# Patient Record
Sex: FEMALE | Race: WHITE
[De-identification: names, ages, dates, MRNs, and addresses within clinical notes are randomized per-mention and may not be internally consistent; named-entity substitution may affect disease eponyms.]

---

## 2017-01-13 ENCOUNTER — HOSPITAL ENCOUNTER (OUTPATIENT)
Dept: HOSPITAL 80 - FIMAGING | Age: 62
End: 2017-01-13
Attending: PODIATRIST
Payer: COMMERCIAL

## 2017-01-13 DIAGNOSIS — S93.402A: Primary | ICD-10-CM

## 2017-01-13 NOTE — MR
MRI of the Left Forefoot, Without Contrast, January 13, 2017

 

History: Second plantar plate dysfunction and pain.

 

Technique: Axial, sagittal, and coronal MR sequences of the left forefoot are obtained.

 

Findings: On the coronal series, there is evidence of attenuation and partial tear of the plantar fadi
te over the second metatarsophalangeal joint. This finding is best visualized on coronal image 18 of 
series 5 where there are linear hyperintense striations extending through the plantar plate, especial
ly along the lateral margin. Findings appear to represent a partial tear, although a small full-thick
ness perforation is not excluded. There is mild adjacent soft tissue edema including edema along the 
flexor digitorum tendon group. The articular cartilage of the second metatarsophalangeal joint is nor
mal, and the metatarsals maintain normal signal characteristics. Mild features of hallux valgus noted
, without significant chondral loss at the first metatarsophalangeal joint. The sesamoids demonstrate
 normal marrow signal characteristics.

 

Impression:

1. Attenuation and partial tear of the central aspect of the plantar plate at the second metatarsopha
langeal joint. A small full-thickness perforation is not excluded. Adjacent inflammatory edema.

2. Mild hallux valgus without degenerative arthropathy at the first metatarsophalangeal joint.

## 2017-01-13 NOTE — MR
MRI of the Left Ankle, Without Contrast     January 13, 2017

 

History:  Lateral ankle instability.

 

Technique:  Axial, sagittal, and coronal MR sequences of the left ankle are obtained.

 

Findings:  The retromalleolar sulcus is hypoplastic, with secondary lateral subluxation of the perone
al tendons.  The peroneus brevis is interposed between the peroneus longus and the lateral malleolus,
 and there is a partial longitudinal split tear and flattening of the peroneus brevis at the retromal
leolar sulcus.  Additionally, there is marked focal thinning of the peroneus longus as it enters the 
cuboid tunnel compatible with partial tear.  The distal peroneus longus is intact as is the distal pe
roneus brevis.

 

Ankle and subtalar articular cartilage surfaces are intact.  The anterior and posterior talofibular a
nd syndesmotic ligaments are intact.  Flexor tendons are intact.  Achilles tendon and plantar fascia 
appear intact.  Deltoid and spring ligaments are intact.

 

Impression:  Hypoplastic retromalleolar sulcus, with secondary subluxation laterally of the peroneal 
tendons.  Partial longitudinal split tear of the peroneus brevis, along with focal partial tear of th
e peroneus longus at the cuboid hiatus.

## 2017-04-03 ENCOUNTER — HOSPITAL ENCOUNTER (OUTPATIENT)
Dept: HOSPITAL 80 - FSGY | Age: 62
Discharge: HOME | End: 2017-04-03
Attending: PODIATRIST
Payer: COMMERCIAL

## 2017-04-03 DIAGNOSIS — M25.775: ICD-10-CM

## 2017-04-03 DIAGNOSIS — S93.492A: ICD-10-CM
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
## 2017-04-03 DIAGNOSIS — Q66.21: ICD-10-CM

## 2017-04-03 DIAGNOSIS — S93.145A: ICD-10-CM

## 2017-04-03 DIAGNOSIS — M21.622: ICD-10-CM

## 2017-04-03 DIAGNOSIS — M20.12: Primary | ICD-10-CM

## 2017-04-03 PROCEDURE — 0SQL0ZZ REPAIR LEFT TARSOMETATARSAL JOINT, OPEN APPROACH: ICD-10-PCS | Performed by: PODIATRIST

## 2017-04-03 PROCEDURE — 0QBR0ZZ EXCISION OF LEFT TOE PHALANX, OPEN APPROACH: ICD-10-PCS | Performed by: PODIATRIST

## 2017-04-03 PROCEDURE — 0Q8P0ZZ DIVISION OF LEFT METATARSAL, OPEN APPROACH: ICD-10-PCS | Performed by: PODIATRIST

## 2017-04-03 PROCEDURE — 28298 COR HLX VLGS PRX PHLX OSTEOT: CPT

## 2017-04-03 PROCEDURE — 28304 INCISION OF MIDFOOT BONES: CPT

## 2017-04-03 PROCEDURE — 0LQW0ZZ REPAIR LEFT FOOT TENDON, OPEN APPROACH: ICD-10-PCS | Performed by: PODIATRIST

## 2017-04-03 PROCEDURE — 28288 PARTIAL REMOVAL OF FOOT BONE: CPT

## 2017-04-03 PROCEDURE — C1713 ANCHOR/SCREW BN/BN,TIS/BN: HCPCS

## 2017-04-03 PROCEDURE — 28645 REPAIR TOE DISLOCATION: CPT

## 2017-04-03 PROCEDURE — 0SSN04Z REPOSITION LEFT METATARSAL-PHALANGEAL JOINT WITH INTERNAL FIXATION DEVICE, OPEN APPROACH: ICD-10-PCS | Performed by: PODIATRIST

## 2017-04-03 PROCEDURE — 0QBP0ZZ EXCISION OF LEFT METATARSAL, OPEN APPROACH: ICD-10-PCS | Performed by: PODIATRIST

## 2017-04-03 PROCEDURE — C1769 GUIDE WIRE: HCPCS

## 2017-04-04 NOTE — GHP
[f rep st]



                                                       PREOP HISTORY AND PHYSICAL





DATE OF ADMISSION:  04/03/2017



PREOPERATIVE DIAGNOSES:  

1.  Rupture peroneus brevis tendon, left ankle.

2.  Dislocating peroneal tendons, left ankle.

3.  Hallux valgus deformity, left foot.

4.  Metatarsus primus varus, left foot.

5.  Bunionette deformity, left foot.

6.  Rupture plantar plate second metatarsophalangeal joint, left foot.

7.  Subluxation second metatarsophalangeal joint, left foot.

8.  Metatarsalgia, left foot.

9.  First metatarsal cuneiform exostosis, left foot.



OPERATION PERFORMED:  

1.  Left ankle:.

a.  Repair peroneus brevis tendon.

b.  Repair dislocating peroneal tendons without fibular osteotomy.

2.  Left foot:.

a.  Repair hallux valgus.

b.  First metatarsal osteotomy.

c.  Fifth metatarsal osteotomy.

d.  Primary repair plantar plate, second metatarsophalangeal joint.

e.  Open treatment of subluxation with internal fixation, second metatarsophalangeal joint.

f.  Second metatarsal osteotomy.

g.  Partial excision bone, first metatarsal cuneiform joint.



POSTOPERATIVE DIAGNOSES:  

1.  Rupture peroneus brevis tendon, left ankle.

2.  Dislocating peroneal tendons, left ankle.

3.  Hallux valgus deformity, left foot.

4.  Metatarsus primus varus, left foot.

5.  Bunionette deformity, left foot.

6.  Rupture plantar plate second metatarsophalangeal joint, left foot.

7.  Subluxation second metatarsophalangeal joint, left foot.

8.  Metatarsalgia, left foot.

9.  First metatarsal cuneiform exostosis, left foot.



ANESTHESIA:  Marija Kaiser MD



DESCRIPTION OF PROCEDURE:  The patient was taken to the operating room and placed on the table in th
e supine position. Local anesthetic combined with popliteal block and LMA were utilized for patient 
anesthesia. The patient's left foot, ankle and lower leg were prepped and draped in the usual asepti
c manner, establishing a sterile field for surgery. Disposable pneumatic tourniquet over heavy Kerli
x padding at high ankle level was inflated to 240 mmHg to provide intraoperative hemostasis. 



The foot procedures were initially approached. Hallux valgus deformity was approached with a 9 cm li
near incision medial to the extensor tendon. The incision was deepened. Bleeding vessels clamped and
 Bovied. Vital structures identified and retracted. This was the norm for all 6 skin incisions. Soft
 tissue planes were developed about the first MTPJ and the intermetatarsal space was entered. A late
ral release was performed with sesamoid and adductor release. The brevis was left intact. An inverte
d L capsulotomy gained access to the joint capsule and periosteum were reflected from about the head
 and neck of the metatarsal exposing large medial hyperostosis. The medial hyperostosis was resected
 flush with the shaft of the metatarsal utilizing the oscillating saw. During all bone cuts, antibio
tic solution drip prevented bone burning, following cuts removed debris, and intermittently througho
ut surgery prevented tissue drying. A 0.45 Ang wire was driven to function as apex for osteoto
my guide which was employed to perform the long dorsal wing V osteotomy, first metatarsal neck level
. The capital fragment was displaced the appropriate amount on the shaft of the metatarsal, firmly i
mpacted with hand pressure, temporarily fixated with a K-wire, and permanent fixation was with 2.0 m
m cortical screw from a [employer] modular hand set. Two screws were used and with appropriate AO techn
ique, fixation was firm and apposition excellent. The preceding was the case for each of the 3 metat
arsal osteotomies. The K-wire was removed, the screws tightened and the redundant distal medial shaf
t of the metatarsal was resected flush with the transposed capital fragment. A bur smoothed all cut 
bone edges. Copious irrigation removed all debris. Minimal medial capsular tissues were reefed. Medi
al capsular closure with 2-0 Vicryl, dorsal with 3-0 Vicryl. Excellent alignment of the hallux was a
ppreciated and subcutaneous closure was with 5-0 Vicryl, skin with 5-0 Prolene. Throughout closure, 
repeated copious sterile saline solution flush prevented tissue drying. 

Attention was now directed to the lateral aspect of the foot where a bunionette deformity was presen
feli. A 5 cm incision was made lateral to the extensor tendon. Again, dissection exposed the fifth me
tatarsophalangeal joint which was entered in a linear fashion with capsule and periosteum being refl
ected from about the head and neck of the fifth metatarsal exposing a large lateral hyperostosis. Os
cillating saw was utilized to resect the hyperostosis and then a wedge shaped metatarsal osteotomy o
bliquely oriented at metatarsal neck level was performed with healthy remaining hinge. The osteotomy
 was fixated with again a 2.0 mm cortical screw. Copious irrigation was performed. Capsular closure 
with 3-0 Vicryl, subq 5-0 Vicryl, skin 5-0 Prolene. 



Attention was now directed to the second metatarsophalangeal joint where plantar plate dysfunction w
as present. A dorsal incision was made 4 cm in length with dissection exposing the joint which was e
ntered in a linear fashion allowing for exposure of the metatarsal head and subluxation of this join
t. The sagittal saw was now utilized to perform the Weil osteotomy which was fixated with a 2.0 mm c
ortical screw. The dorsal lip was resected with bone rongeurs. The toe was relocated and copious irr
igation performed (as was the case with each incision) throughout closure. Capsular structures reapp
roximated with 3-0 Vicryl, subq 5-0 Vicryl, skin 5-0 Prolene. Next, attention was directed to the pl
ila aspect of the second metatarsophalangeal joint. The fourth incision was made overlying the tooite
nt utilizing skin marker for accurate closure of this wound. Dissection continued through the fibrof
atty pad and visualization of the flexor tendon apparatus was obtained. The tendons were mobilized a
nd retracted medially allowing for visualization of the grossly thinned plantar plate with midsubsta
nce rupture as seen with preoperative imaging studies. With 3-0 Vicryl, reconstruction of the planta
r plate was performed with primary repair and utilizing adjacent soft tissue to cover the defect. Th
e tendons were replaced. Deep closure of this wound was with 3-0 Vicryl and the skin was closed with
 5-0 Prolene. The fifth incision was now made directly overlying the dorsal exostosis at the first m
etatarsal cuneiform joint level. Linear incision through the capsule with reflection of capsule and 
periosteum of the dorsal exostosis was performed, allowing for resection of the prominence utilizing
 bone rongeurs and an egg-shaped bur. Copious irrigation removed all debris. Good closure of the cap
tiff/periosteal layer with 3-0 Vicryl, subq closure with 5-0 Vicryl, skin with 5-0 Prolene. The butch
ent was now turned onto her right side for exposure to the lateral ankle. A hockey-stick incision wa
s made just proximal to the fibula. The incision deepened. Bleeding vessels clamped and Bovied. Lesvia
l structures identified and retracted. The soft tissue plane immediately superficial to fibula and s
uperior peroneal retinaculum/peroneal tendon apparatus was developed and utilizing 15 blade, sharp i
ncision was made on the posterior lateral corner of the fibula. This allowed to reflection, anterior
 and posterior, of the retinaculum and the tendon apparatus. The tendons were retracted to allow ins
pection of the shallow groove which was necessarily deepened. Utilizing a 2.5 mm drill from the dist
al aspect of the fibula, a drill hole was made immediately deep to the fibular groove. Then utilizin
g a bony tamp, the groove was deepened into the drill hole previously mentioned. Nice deepening and 
enlargement of the fibular groove was accomplished in this manner and at this point, inspection of t
he peroneus brevis was performed with longitudinal tear as identified with preoperative imaging, rep
aired utilizing simple interrupted sutures of 3-0 Vicryl. Now the tendon sheath and the superior per
wills retinaculum were reconstructed. Two drill holes were made on the fibula for passage of 2-0 Eth
ibond suture which following reefing of redundant retinacular tissues was utilized to firmly secure 
the superior peroneal retinaculum and reconstruct the peroneal tendon sheath. Closure of this wound 
proceeded in a similar fashion with irrigation, 3-0 and 5-0 Vicryl, then 5-0 Prolene. 



The tourniquet was released during closure of wounds and hemostasis was well maintained. Immediate p
erfusion of all digits was appreciated. Betadine soaked Adaptic was laid over each of the 6 incision
s and the foot and ankle received a complete sterile dressing overlaid with cast padding, Kerlix and
 Ace wraps. The patient was now taken to recovery in good condition, having tolerated surgery and an
esthesia well.



ESTIMATED BLOOD LOSS:  Minimal.



SPECIMEN:  No specimen.



COMPLICATIONS:  No complications.



POSTOPERATIVE INSTRUCTIONS:  Previously gone over were reinforced with the patient and her , 
[place].



PROGNOSIS:  Good. 



SNR including below.





Job #:  [id number]

## 2017-11-01 ENCOUNTER — HOSPITAL ENCOUNTER (OUTPATIENT)
Dept: HOSPITAL 80 - FIMAGING | Age: 62
End: 2017-11-01
Attending: INTERNAL MEDICINE
Payer: COMMERCIAL

## 2017-11-01 DIAGNOSIS — Z12.31: Primary | ICD-10-CM

## 2017-11-01 PROCEDURE — G0202 SCR MAMMO BI INCL CAD: HCPCS

## 2018-11-05 ENCOUNTER — HOSPITAL ENCOUNTER (OUTPATIENT)
Dept: HOSPITAL 80 - FIMAGING | Age: 63
End: 2018-11-05
Attending: INTERNAL MEDICINE
Payer: COMMERCIAL

## 2018-11-05 DIAGNOSIS — Z98.82: ICD-10-CM

## 2018-11-05 DIAGNOSIS — Z12.31: Primary | ICD-10-CM
